# Patient Record
Sex: FEMALE | Race: WHITE | Employment: FULL TIME | URBAN - METROPOLITAN AREA
[De-identification: names, ages, dates, MRNs, and addresses within clinical notes are randomized per-mention and may not be internally consistent; named-entity substitution may affect disease eponyms.]

---

## 2022-07-25 ENCOUNTER — OFFICE VISIT (OUTPATIENT)
Dept: ENT CLINIC | Age: 57
End: 2022-07-25
Payer: COMMERCIAL

## 2022-07-25 VITALS
WEIGHT: 209.8 LBS | TEMPERATURE: 97.2 F | BODY MASS INDEX: 33.72 KG/M2 | DIASTOLIC BLOOD PRESSURE: 71 MMHG | HEART RATE: 79 BPM | HEIGHT: 66 IN | SYSTOLIC BLOOD PRESSURE: 135 MMHG

## 2022-07-25 DIAGNOSIS — H90.3 SENSORINEURAL HEARING LOSS, BILATERAL: Primary | ICD-10-CM

## 2022-07-25 PROCEDURE — 99204 OFFICE O/P NEW MOD 45 MIN: CPT | Performed by: OTOLARYNGOLOGY

## 2022-07-25 NOTE — PROGRESS NOTES
CHIEF COMPLAINT: Hearing loss    HISTORY OF PRESENT ILLNESS:  49 y.o. female referred by Dr. Taisha Zhong who presents with concern of hearing loss in both ears which began 16 days ago. In the middle of the day the patient felt the sudden onset of fullness in both the ears with decrease in hearing. He had some nasal inhalant allergy symptoms at that time. He had no ear pain or otorrhea. She consulted an urgent care center where she was placed on 10 mg of prednisone a day for 5 days but her symptoms did not improve. She consulted her primary care physician who started her on a 12-day course of decreasing steroids. She is now on the 11th day and feels no improvement. No tinnitus. No vertigo. PAST MEDICAL HISTORY:   Social History     Tobacco Use   Smoking Status Never   Smokeless Tobacco Never                                                    Social History     Substance and Sexual Activity   Alcohol Use Yes                                                  No current outpatient medications on file. Past Medical History:   Diagnosis Date    Allergic rhinitis     Asthma     Dizziness     Hearing loss     Tinnitus                                                     Past Surgical History:   Procedure Laterality Date    TONSILLECTOMY       FAMILY HISTORY: Family history reviewed. Except as noted in history of present illness, there is no pertinent family history      REVIEW OF SYSTEMS:  All pertinent positive and negative review of systems included in HPI. Otherwise, all systems are reviewed and negative.     PHYSICAL EXAMINATION:   GENERAL: wdwn- no acute distress  RESPIRATORY:  No stridor or respiratory distress  COMMUNICATION :  Normal voice  MENTAL STATUS:  Mood and affect normal, oriented X 3  HEAD AND FACE:  No abnormalities of the skin of face or head  EXTERNAL EARS AND NOSE:  Normal pinnae bilateral  FACIAL MUSCLES:  All branches of facial nerve intact  EXTRAOCULAR MUSCLES: Intact with full range of motion  FACE PALPATION:  No tenderness over sinuses. Zygomatic arches and orbital rims intact  OTOSCOPY:  Normal external auditory canals, tympanic membranes, and middle ear spaces  TUNING FORKS: Rinne ++ Martin midline at 512 Hz  INTRANASAL:  Septum midline, turbinates normal, meati clear. LIPS, TEETH, GINGIVA:  Normal mucosa  PHARYNX:  Normal  NECK:  No masses. LYMPHATIC:  No cervical adenopathy  SALIVARY GLANDS:  No swelling or masses in the parotid or submandibular salivary glands  THYROID:  No goiter or thyroid masses. IMPRESSION: Subjective hearing loss with normal otoscopy, refractory to steroid treatment. PLAN: Audiogram ordered. Our audiologist is unavailable this week but we will try and get a hearing test done at another of our audiology facilities. FOLLOW-UP: After audiometric evaluation.

## 2022-07-26 ENCOUNTER — PROCEDURE VISIT (OUTPATIENT)
Dept: AUDIOLOGY | Age: 57
End: 2022-07-26
Payer: COMMERCIAL

## 2022-07-26 DIAGNOSIS — H90.A22 SENSORINEURAL HEARING LOSS (SNHL) OF LEFT EAR WITH RESTRICTED HEARING OF RIGHT EAR: Primary | ICD-10-CM

## 2022-07-26 PROCEDURE — 92557 COMPREHENSIVE HEARING TEST: CPT | Performed by: AUDIOLOGIST

## 2022-07-26 PROCEDURE — 92567 TYMPANOMETRY: CPT | Performed by: AUDIOLOGIST

## 2022-07-26 NOTE — PROGRESS NOTES
[facility]    7/26/2022     Patient name: Grace Carter  Primary Care Physician: [address] [place] [address]   Medical Record Number: [id number]     Grace Carter   1965, 49 y.o. female   [id number]       Referring Provider: Vicente Taylor MD  Referral Type: In an order in [address] [place] [address]    Reason for Visit: Evaluation of the cause of disorders of hearing, tinnitus, or balance. ADULT AUDIOLOGIC EVALUATION                    Grace Carter is a 49 y.o. female seen today, 7/26/2022 , for audiologic evaluation. AUDIOLOGIC AND OTHER PERTINENT MEDICAL HISTORY:      Grace Carter presents with sudden onset of diminished hearing bilaterally, more impacted on left side. Tried prednisone on two occasions with no improvement since onset of the hearing loss. Patient was seen by Vicente Taylor MD on 7/25/2022. IMPRESSIONS:      Today's results are consistent with left asymmetrical profound sensorineural hearing loss with poor word recognition and normal middle ear function. Right ear demonstrates mild high frequency sensorineural loss with excellent word recognition and normal middle ear function. Patient to follow medical recommendations per  Vicente Taylor MD.     ASSESSMENT AND FINDINGS:     Otoscopy revealed: Visible tympanic membrane bilaterally    Reliability: Good   Transducer: Inserts    RIGHT EAR:  Hearing Sensitivity: Normal to mild sensorineural hearing loss. Speech Recognition Threshold: 20 dB HL  Word Recognition: Excellent (%), based on NU-6   Tympanometry: Normal peak pressure and compliance, Type A tympanogram, consistent with normal middle ear function. Acoustic Reflexes: Ipsilateral: Present at elevated sensation levels and Absent at isolated frequencies. LEFT EAR:  Hearing Sensitivity: Profound asymmetrical sensorineural hearing loss.   Speech Recognition Threshold: 90 dB HL  Word Recognition: Very Poor (0-59%), based on NU-6 Tympanometry: Normal peak pressure and compliance, Type A tympanogram, consistent with normal middle ear function. Acoustic Reflexes: Ipsilateral: Present at normal sensation levels, Present at elevated sensation levels, and Absent at isolated frequencies. COUNSELING:      Reviewed purpose of testing completed today, general auditory system function, and results obtained today. The following items are recommended based on patient report and results from today's appointment:   - Continue medical follow-up with Ирина José MD.   - Retest hearing as medically indicated and/or sooner if a change in hearing is noted. Chart CC'd to: Ирина José MD      Degree of   Hearing Sensitivity dB Range   Within Normal Limits (WNL) 0 - 20   Mild 20 - 40   Moderate 40 - 55   Moderately-Severe 55 - 70   Severe 70 - 90   Profound 90 +        RECOMMENDATIONS:        Dr Adriana Stewart to medically advise.     Martha Avery  Audiologist    Electronically signed by Martha Avery on 7/26/22 at 1:43 PM.

## 2022-07-26 NOTE — Clinical Note
Hi Dr Adriana Stewart, audiogram is ready for review. Confirms, bilateral sensorineural impairment, with profound left sided asymmetry-responses were vibrotactile.    TY.

## 2022-07-27 ENCOUNTER — TELEPHONE (OUTPATIENT)
Dept: ENT CLINIC | Age: 57
End: 2022-07-27

## 2022-07-27 ENCOUNTER — OFFICE VISIT (OUTPATIENT)
Dept: ENT CLINIC | Age: 57
End: 2022-07-27
Payer: COMMERCIAL

## 2022-07-27 VITALS
DIASTOLIC BLOOD PRESSURE: 69 MMHG | HEIGHT: 66 IN | WEIGHT: 205.8 LBS | HEART RATE: 74 BPM | SYSTOLIC BLOOD PRESSURE: 119 MMHG | BODY MASS INDEX: 33.07 KG/M2 | TEMPERATURE: 97.3 F

## 2022-07-27 DIAGNOSIS — H91.22 SUDDEN HEARING LOSS, LEFT: ICD-10-CM

## 2022-07-27 DIAGNOSIS — H90.3 SENSORINEURAL HEARING LOSS, BILATERAL: Primary | ICD-10-CM

## 2022-07-27 PROCEDURE — 99212 OFFICE O/P EST SF 10 MIN: CPT | Performed by: OTOLARYNGOLOGY

## 2022-07-27 RX ORDER — ACYCLOVIR 400 MG/1
400 TABLET ORAL
Qty: 40 TABLET | Refills: 0 | Status: SHIPPED | OUTPATIENT
Start: 2022-07-27 | End: 2022-08-04

## 2022-07-27 RX ORDER — PREDNISONE 50 MG/1
50 TABLET ORAL DAILY
Qty: 5 TABLET | Refills: 0 | Status: SHIPPED | OUTPATIENT
Start: 2022-07-27 | End: 2022-08-01

## 2022-07-27 NOTE — TELEPHONE ENCOUNTER
Pt called says they went to the pharmacy to fill the prednisone tablets given by Dr. Boris Baron, they told the pt that taking prednisone for 12 days in total was not good and that she should not take anymore prednisone. Does Dr. Boris Baron have any alternatives? Please give the pt a call back to discuss other options if possible, thank you!

## 2022-07-27 NOTE — PROGRESS NOTES
FOLLOW UP VISIT:    CHIEF COMPLAINT: Hearing loss    INTERIM HISTORY: Seen 3 days ago with complaint of hearing loss in both ears, left worse than right. Began a little over 2 weeks prior to our visit. Otoscopy was normal.  Audiogram ordered. PAST MEDICAL HISTORY:   Social History     Tobacco Use   Smoking Status Never   Smokeless Tobacco Never                                                    Social History     Substance and Sexual Activity   Alcohol Use Yes                                                  No current outpatient medications on file. Past Medical History:   Diagnosis Date    Allergic rhinitis     Asthma     Dizziness     Hearing loss     Tinnitus                                                     Past Surgical History:   Procedure Laterality Date    TONSILLECTOMY         FAMILY HISTORY: Family history reviewed and except as pertinent to the interim history is not contributory. REVIEW OF SYSTEMS:  All pertinent positive and negative findings included in HPI. Otherwise, all other systems are reviewed and are negative    PHYSICAL EXAMINATION:   GENERAL: wdwn- no acute distress  RESPIRATORY: No stridor. COMMUNICATION :  Normal voice  MENTAL STATUS: Alert and oriented x3  HEAD AND FACE: No skin lesions of the face. EXTERNAL EARS AND NOSE: The external ears and nasal pyramid are normal.  FACIAL MUSCLES: All branches of the facial nerve intact. EXTRAOCULAR MUSCLES: Intact with full range of motion. OTOSCOPY:  Normal tympanic membranes, middle ear spaces, and external auditory canals. TUNING FORKS:  Rinne ++ Martin midline at 512 Hz  AUDIOGRAM & TYMPANOGRAM REVIEWED:  Profound hearing loss in the left ear. Auditory discrimination is also markedly decreased. Middle ear dynamics are normal.  IMPRESSION: Profound sudden sensorineural hearing loss. PLAN: Reviewed audiogram with patient.   Discussed the etiology of sudden sensorineural hearing loss.  Prednisone 50 mg/day prescribed for 5 days. In addition, acyclovir 2 g/day prescribed. FOLLOW-UP: 5 days, at which time we will reassess hearing and begin tapering the steroids.

## 2022-08-02 ENCOUNTER — OFFICE VISIT (OUTPATIENT)
Dept: ENT CLINIC | Age: 57
End: 2022-08-02
Payer: COMMERCIAL

## 2022-08-02 VITALS
HEIGHT: 66 IN | BODY MASS INDEX: 33.4 KG/M2 | WEIGHT: 207.8 LBS | TEMPERATURE: 97.4 F | SYSTOLIC BLOOD PRESSURE: 109 MMHG | HEART RATE: 84 BPM | DIASTOLIC BLOOD PRESSURE: 74 MMHG

## 2022-08-02 DIAGNOSIS — H90.3 SENSORINEURAL HEARING LOSS, BILATERAL: Primary | ICD-10-CM

## 2022-08-02 DIAGNOSIS — H91.22 SUDDEN HEARING LOSS, LEFT: ICD-10-CM

## 2022-08-02 PROCEDURE — 99212 OFFICE O/P EST SF 10 MIN: CPT | Performed by: OTOLARYNGOLOGY

## 2022-08-02 RX ORDER — PREDNISONE 10 MG/1
TABLET ORAL
Qty: 20 TABLET | Refills: 0 | Status: SHIPPED | OUTPATIENT
Start: 2022-08-02 | End: 2022-08-12

## 2022-08-02 NOTE — PROGRESS NOTES
FOLLOW UP VISIT:    CHIEF COMPLAINT: Sudden hearing loss. INTERIM HISTORY: Profound sudden sensorineural hearing loss in the left ear. Diagnosed 5 days ago. Because the audiogram showed such profound loss the patient was started on prednisone 50 mg a day for 5 days as well as antivirals. No subjective improvement. PAST MEDICAL HISTORY:   Social History     Tobacco Use   Smoking Status Never   Smokeless Tobacco Never                                                    Social History     Substance and Sexual Activity   Alcohol Use Yes                                                    Current Outpatient Medications:     acyclovir (ZOVIRAX) 400 MG tablet, Take 1 tablet by mouth 5 times daily for 8 days, Disp: 40 tablet, Rfl: 0                                                 Past Medical History:   Diagnosis Date    Allergic rhinitis     Asthma     Dizziness     Hearing loss     Tinnitus                                                     Past Surgical History:   Procedure Laterality Date    TONSILLECTOMY         FAMILY HISTORY: Family history reviewed and except as pertinent to the interim history is not contributory. REVIEW OF SYSTEMS:  All pertinent positive and negative findings included in HPI. Otherwise, all other systems are reviewed and are negative    PHYSICAL EXAMINATION:   GENERAL: wdwn- no acute distress  RESPIRATORY: No stridor. COMMUNICATION :  Normal voice  MENTAL STATUS: Alert and oriented x3  HEAD AND FACE: No skin lesions of the face. EXTERNAL EARS AND NOSE: The external ears and nasal pyramid are normal.  FACIAL MUSCLES: All branches of the facial nerve intact. EXTRAOCULAR MUSCLES: Intact with full range of motion. OTOSCOPY:  Normal tympanic membranes, middle ear spaces, and external auditory canals. TUNING FORKS:  Rinne ++ Martin midline at 512 Hz      IMPRESSION: Profound sudden sensorineural hearing loss left ear.     PLAN: Begin to taper prednisone over an 8-day course. Follow-up audiogram in 3 weeks. With discussed with the patient the possibility of intratympanic corticosteroids. Also brought up the suggestion of imaging the internal auditory canals. The present time, the patient's auditory discrimination is at 44%. If this does improve amplification may be indicated.   FOLLOW-UP: 3 weeks

## 2022-08-25 ENCOUNTER — PROCEDURE VISIT (OUTPATIENT)
Dept: AUDIOLOGY | Age: 57
End: 2022-08-25
Payer: COMMERCIAL

## 2022-08-25 ENCOUNTER — OFFICE VISIT (OUTPATIENT)
Dept: ENT CLINIC | Age: 57
End: 2022-08-25
Payer: COMMERCIAL

## 2022-08-25 VITALS
WEIGHT: 219 LBS | HEIGHT: 66 IN | TEMPERATURE: 97.4 F | HEART RATE: 85 BPM | SYSTOLIC BLOOD PRESSURE: 135 MMHG | BODY MASS INDEX: 35.2 KG/M2 | DIASTOLIC BLOOD PRESSURE: 78 MMHG

## 2022-08-25 DIAGNOSIS — H90.3 SENSORINEURAL HEARING LOSS, BILATERAL: Primary | ICD-10-CM

## 2022-08-25 DIAGNOSIS — H91.22 SUDDEN HEARING LOSS, LEFT: ICD-10-CM

## 2022-08-25 DIAGNOSIS — H90.3 SENSORINEURAL HEARING LOSS, BILATERAL: ICD-10-CM

## 2022-08-25 DIAGNOSIS — H91.22 SUDDEN HEARING LOSS, LEFT: Primary | ICD-10-CM

## 2022-08-25 DIAGNOSIS — H93.8X2 EAR PRESSURE, LEFT: ICD-10-CM

## 2022-08-25 DIAGNOSIS — H93.12 TINNITUS, LEFT EAR: ICD-10-CM

## 2022-08-25 PROCEDURE — 92552 PURE TONE AUDIOMETRY AIR: CPT | Performed by: AUDIOLOGIST

## 2022-08-25 PROCEDURE — 92556 SPEECH AUDIOMETRY COMPLETE: CPT | Performed by: AUDIOLOGIST

## 2022-08-25 PROCEDURE — 99213 OFFICE O/P EST LOW 20 MIN: CPT | Performed by: OTOLARYNGOLOGY

## 2022-08-25 NOTE — PROGRESS NOTES
Jesse Hinson   1965, 56 y.o. female   [id number]       Referring Provider: Margie Shah MD  Referral Type: In an order in [address] [place] [address]    Reason for Visit: Evaluation of suspected change in hearing, tinnitus, or balance. ADULT AUDIOLOGIC EVALUATION      Jesse Hinson is a 56 y.o. female seen today, 8/25/2022, for a recheck audiologic evaluation. AUDIOLOGIC AND OTHER PERTINENT MEDICAL HISTORY:        Jesse Hinson noted sudden onset left ear hearing loss, onset 7/9/2022, has completed steroid oral medications and been followed by Dr. David Denney, had audiogram completed 7/26/2022 at [facility] as I was out of the office at that time; has tinnitus in left ear, ringing and clicking; fullness in left ear; has had some dizziness, note some dizziness for a couple days after last hearing test.    Jesse Hinson denied otalgia and otorrhea. IMPRESSIONS:       Today's results are consistent with left ear severe to profound sensorineural hearing loss. Hearing loss is significant enough to result in difficulty understanding speech in most to all listening environments. Discussed considerations for management of her hearing loss, including BiCROS, BAHA, and CI, pending medical clearance. Follow medical recommendations from Dr. David Denney. ASSESSMENT AND FINDINGS:       Otoscopy revealed: Clear ear canals bilaterally      RIGHT EAR:  Hearing Sensitivity: Within normal limits through 2000 Hz sloping to mild sensorineural hearing loss. Speech Recognition Threshold: 15 dBHL  Word Recognition: Excellent (96%), based on NU-6 25-word list at 50 dBHL using recorded speech stimuli. Tympanometry: Did not test.      LEFT EAR:  Hearing Sensitivity: Severe to profound sensorineural hearing loss. Speech Recognition Threshold: NR at 110 dBHL, masked; Speech Detection Threshold: 90 dBHL, masked.   Word Recognition: Could not test.  Tympanometry: Did not test.    NOTE: An asymmetry of 15 dBHL or greater is present across all tested frequency; there is also an asymmetry in word recognition     NOTE: Patient noted significant sensitivity to left ear testing 500 Hz and 1000 Hz testing. Reliability: Good  Transducer: Phones    See scanned audiogram dated 8/25/2022 for results. PATIENT EDUCATION:       The following items were discussed with the patient:   - Good Communication Strategies  - Hearing Loss and Hearing Aids  - Tinnitus Management Strategies      Educational information was shared in the After Visit Summary. RECOMMENDATIONS:                                                                                                                                                                                                                                                                      The following items are recommended based on patient report and results from today's appointment:  - Continue medical follow-up with Aixa Savage MD.  - Retest hearing as medically indicated and/or sooner if a change in hearing is noted. - If desired, schedule a Hearing Aid Evaluation (HAE) appointment to discuss hearing aid options. Also discussed considerations for BAHA and CI; she was provided with [facility] contact information: [phone]  - Utilize \"Good Communication Strategies\" as discussed to assist in speech understanding with communication partners. - Maintain a sound enriched environment to assist in the management of tinnitus symptoms. [facility] [place], [place]  Audiologist      Chart CC'd to:  Aixa Savage MD      Degree of   Hearing Sensitivity dB Range   Within Normal Limits (WNL) 0 - 20   Mild 20 - 40   Moderate 40 - 55   Moderately-Severe 55 - 70   Severe 70 - 90   Profound 90 +

## 2022-08-25 NOTE — PROGRESS NOTES
strongly suggest that we image her internal auditory canals. We will defer on injection at this time but will image. FOLLOW-UP: After MRI.

## 2022-08-25 NOTE — PATIENT INSTRUCTIONS
Good Communication Strategies    Communication can be challenging for anyone, but can be especially difficult for those with some degree of hearing loss. While we may not be able to control every factor that may lead to difficulty with communication, there are Good Communication Strategies that we can all use in our day-to-day lives. Communication takes both parties working together for it to be successful. Tips as a Listener:   Control your environment. It is important to limit the amount of background noise in the room when possible. You should also consider having a good light source in the room to best see the other person. Ask for clarification. Instead of saying \"What?\", you can use parts of what you heard to make a new question. For example, if you heard the word \"Thursday\" but not the rest of the week, you may ask \"What was that about Thursday? \" or \"What did you want to do Thursday? \". This shows the person talking that you are listening and will help them better explain what they are saying. Be an advocate for yourself. If you are hearing but not understanding, tell the other person \"I can hear you, but I need you to slow down when you speak. \"  Or if someone is facing the other direction, say \"I cannot hear you when you are not looking at me when we talk. \"       Tips as a Talker:   - Sit or stand 3 to 6 feet away to maximize audibility         -- It is unrealistic to believe someone else will fully hear your message if you are speaking from across the room or in a different room in the house   - Stay at eye level to help with visual cues   - Make sure you have the persons attention before speaking   - Use facial expressions and gestures to accentuate your message   - Raise your voice slightly (do not scream)   - Speak slowly and distinctly   - Use short, simple sentences   - Rephrase your words if the person is having a hard time understanding you    - To avoid distortion, dont speak directly into a persons ear      Some additional items that may be helpful:   - Remain patient - this is important for both parties   - Write down items that still cannot be heard/understood. You may write with pen/paper or consider typing/texting on a cell phone or smart device. - If background noise is unavoidable, try to keep yourself in a good position in the room. By sitting at a mohr on the side of the restaurant (preferably a corner), it will be easier to communicate than if you were sitting at a table in the middle with background noise surrounding you. Keep yourself positioned away from music speakers or heavy foot traffic.   - If you have difficulty with the television, consider these options:      -- Use closed-captioning, which is a setting you can turn on that displays the spoken words in a written form on the screen. There may be a slight delay, but this can help fill in missing information. This can be especially helpful when watching programs with accented speech. -- Consider use of a sound bar or speakers that come from the front of the TV. With modern flat screen TVs, many of them have speakers that come out of the back of the device, which makes sound bounce off the wall behind it, then go into the room. Sound bars can allow the sound to go straight in your direction and can improve sound quality. -- Consider ear level devices to help improve the volume and/or sound quality of the program.  There are devices that work like headphones that you can adjust the volume for your ears while others can have the volume at a more comfortable level, such as \"TV Ears\". Most hearing aids have devices that allow them to connect directly to the TV and improve sound quality. Tinnitus: Overview and Management Strategies          Many people have some ringing sounds in their ears once in a while. You may hear a roar, a hiss, a tinkle, or a buzz. The sound usually lasts only a few minutes.  If it goes on all the time, you may have tinnitus. Tinnitus is usually caused by long-term exposure to loud noise. This damages the nerves in the inner ear. It can occur with all types of hearing loss. It may be a symptom of almost any ear problem. Tinnitus may be caused by a buildup of earwax. Or, it may be caused by ear infections or certain medicines (especially antibiotics or large amounts of aspirin). You can also hear noises in your ears because of an injury to the ears, drinking too much alcohol or caffeine, or a medical condition. Other conditions may also contribute to tinnitus, including: head and neck trauma, temporomandibular joint disorder (TMJ), sinus pressure and barometric trauma, traumatic brain injury, metabolic disorders, autoimmune disorders, stress, and high blood pressure. You may need tests to evaluate your hearing and to find causes of long-lasting tinnitus. Your doctor may suggest one or more treatments to help you cope with the tinnitus. You can also do things at home to help reduce symptoms. Follow-up care is a key part of your treatment and safety. Be sure to make and go to all appointments, and call your doctor if you are having problems. It's also a good idea to know your test results and keep a list of the medicines you take. How can you care for yourself at home? Limit or cut out alcohol, caffeine, and sodium. They can make your symptoms worse. Do not smoke or use other tobacco products. Nicotine reduces blood flow to the ear and makes tinnitus worse. If you need help quitting, talk to your doctor about stop-smoking programs and medicines. These can increase your chances of quitting for good. Talk to your doctor about whether to stop taking aspirin and similar products such as ibuprofen or naproxen. Get exercise often. It can help improve blood flow to the ear. Ways to manage/cope with tinnitus  Some tinnitus may last a long time.  To manage your tinnitus, try to:  Avoid noises that you think caused your tinnitus. If you can't avoid loud noises, wear earplugs or earmuffs. Ignore the sound by paying attention to other things. Keeping your brain busy with other tasks or background noise can help your brain not focus on the tinnitus. Try to not give the tinnitus an emotional reaction. Do your best to ignore the sound and not let it bother you. Relax using biofeedback, meditation, or yoga. Feeling stressed and being tired can make tinnitus worse. Play music or white noise to help you sleep. Background noise may cover up the noise that you hear in your ears. You can buy a tabletop machine or a device that sits under your pillow to play soothing sounds, like ocean waves. Smart phones have free apps, such as Whist, Relax Melodies, ReSound Relief, and [employer]. These apps have different types of sounds/noise, some of which you can blend together to find sounds that are most soothing to you. Hearing aid technology, especially when there is some hearing loss, may help reduce tinnitus symptoms by giving your brain better access to the sounds it is missing. There are some hearing aids with built-in noise generator programs, which may help when amplification alone is not enough. Additional resources may be found through the American Tinnitus Association at www.olga.org    When should you call for help? Call 911 anytime you think you may need emergency care. For example, call if:    You have symptoms of a stroke. These may include:  Sudden numbness, tingling, weakness, or loss of movement in your face, arm, or leg, especially on only one side of your body. Sudden vision changes. Sudden trouble speaking. Sudden confusion or trouble understanding simple statements. Sudden problems with walking or balance. A sudden, severe headache that is different from past headaches. Call your doctor now or seek immediate medical care if:    You develop other symptoms. sounds it is missing. If you are beginning your process with hearing aid(s), schedule a \"Hearing Aid Evaluation\" with an audiologist to discuss your lifestyle, features of hearing aid technology, and styles of hearing aids available. It is recommended that you contact your insurance company to determine if you have a hearing aid benefit, as this may dictate who you can see for these services. Have hearing tests as your doctor suggests. They can show whether your hearing has changed. Your hearing aid may need to be adjusted. Use other assistive devices as needed. These may include:  Telephone amplifiers and hearing aids that can connect to a television, stereo, radio, or microphone. Devices that use lights or vibrations. These alert you to the doorbell, a ringing telephone, or a baby monitor. Television closed-captioning. This shows the words at the bottom of the screen. Most new TVs can do this. TTY (text telephone). This lets you type messages back and forth on the telephone instead of talking or listening. These devices are also called TDD. When messages are typed on the keyboard, they are sent over the phone line to a receiving TTY. The message is shown on a monitor. Use pagers, fax machines, text, and email if it is hard for you to communicate by telephone. Try to learn a listening technique called speech-reading. It is not lip-reading. You pay attention to people's gestures, expressions, posture, and tone of voice. These clues can help you understand what a person is saying. Face the person you are talking to, and have him or her face you. Make sure the lighting is good. You need to see the other person's face clearly. Think about counseling if you need help to adjust to your hearing loss. When should you call for help? Watch closely for changes in your health, and be sure to contact your doctor if:    You think your hearing is getting worse.      You have new symptoms, such as dizziness or nausea.

## 2022-09-08 ENCOUNTER — TELEPHONE (OUTPATIENT)
Dept: ENT CLINIC | Age: 57
End: 2022-09-08

## 2022-10-26 ENCOUNTER — PROCEDURE VISIT (OUTPATIENT)
Dept: AUDIOLOGY | Age: 57
End: 2022-10-26

## 2022-10-26 DIAGNOSIS — H90.3 SENSORINEURAL HEARING LOSS, BILATERAL: Primary | ICD-10-CM

## 2022-10-26 PROCEDURE — 99999 PR OFFICE/OUTPT VISIT,PROCEDURE ONLY: CPT | Performed by: AUDIOLOGIST

## 2022-10-26 NOTE — PROGRESS NOTES
Nisha Petty   1965, 56 y.o. female  [id number]       HEARING [address] was seen today, 10/26/2022 for a Hearing Aid Evaluation. Patient has no prior history of hearing aid use. A demo [address] was programmed to AL3 and were used in today's appointment. INSURANCE:  Her insurance was contacted and she does have a benefit for hearing aids (please see scanned insurance verification worksheet in Media tab). Anticipated $500 benefit. SOUNDFIELD TESTING:       Otoscopy revealed: Clear ear canals bilaterally    Deferred SFT at this time. LIFESTYLE EVALUATION:       Primary listening situations Nisha Petty would like to improve:  Social situations  Work when she is in [employer] at [employer] in car as passenger    Other pertinent lifestyle needs: She works at a [employer] as a  during [employer] hours and also works at home. Phone connectivity:  Patient has an iPhone. She is interested in streaming, phone calls. DEVICE SELECTION:       After a discussion of the various styles, technology levels, and features of available in hearing aid technology in relation to his lifestyle needs, Nisha Petty has decided to pursue hearing aids. Technology to be ordered: [address], R: Oticon More 1 miniRITE-R, L: Oticon CROS PX, #1-60dB receivers, 6mm open bass domes, color 90      Patient cost due at time of fitting: $3600 of total $4100 to be billed to insurance. Will verify OOP prior to fitting. PATIENT EDUCATION:         - Verbally reviewed various styles, technology levels, and features of available hearing aid technology and realistic expectations with amplification.   - Patient was provided with a brochure for Oticon CROS hearing aids. RECOMMENDATIONS:        - Return in about 2 weeks to be fit with hearing aids. An appointment was scheduled for 11/10/2022. She is interested in a sooner appointment if available.       No charge for today's appointment.       [facility]re[facility] [place]  Audiologist

## 2022-10-31 ENCOUNTER — PROCEDURE VISIT (OUTPATIENT)
Dept: AUDIOLOGY | Age: 57
End: 2022-10-31

## 2022-10-31 DIAGNOSIS — H90.3 SENSORINEURAL HEARING LOSS, BILATERAL: Primary | ICD-10-CM

## 2022-10-31 PROCEDURE — 99999 PR OFFICE/OUTPT VISIT,PROCEDURE ONLY: CPT | Performed by: AUDIOLOGIST

## 2022-10-31 NOTE — PROGRESS NOTES
Jossue Lama  [birth year] y.o. female   [id number]      HEARING [address] was seen today, 10/31/22, for a Hearing Aid Evaluation follow-up to be fit with loaner devices until her devices are received. Planned for hearing aid fitting to be moved to today, however, this was not possible given her devices had not been received. Contacted patient earlier today and agreed to being fit with loaner devices until her next scheduled appointment. Completed brief and general device orientation with Oticon OpnS 1 (right) and CROS (left) devices, including insertion/removal, battery insertion/removal, battery warning, water precautions, and expected use. Also paired to her iPhone and demonstrated use of opal for volume adjustment, as OpnS side only has one push button with volume control unavailable.'    Rescheduled fitting date to 11/10/2022. Patient cost due at time of fitting: $3600; will look into OOP and may be less patient responsibility at time of fitting. No charge for today's appointment. RECOMMENDATIONS:      - Return in 1-2 weeks to be fit with hearing aids.         [facility] [place], [place]  Audiologist

## 2022-11-10 ENCOUNTER — PROCEDURE VISIT (OUTPATIENT)
Dept: AUDIOLOGY | Age: 57
End: 2022-11-10

## 2022-11-10 DIAGNOSIS — H90.3 SENSORINEURAL HEARING LOSS, BILATERAL: Primary | ICD-10-CM

## 2022-11-10 NOTE — PROGRESS NOTES
Mikayla Hickey   1965, 56 y.o. female   [id number]     HEARING AID FITTING      RIGHT EAR: /MODEL: Oticon More 1 miniRITE-R  SN: [id number]  WIRE/DOME: #1-60dB/6mm open bass    LEFT EAR: /MODEL: Oticon CROS PX  SN: [id number]  WIRE/DOME: #1-60dB/6mm open bass    : /MODEL: [employer]  SN: [id number]   HAF: 11/10/2022  WARRANTY: 11/27/2025    BUTTONS: Short Press (Volume): Right raises, Left lowers  PROGRAMS: General  PHONE CONNECTIVITY: Paired to iPhone, use of opal      Mikayla Hickey was seen today, 11/10/2022, to be fit with [employer] system. Devices were connected to fitting software, and hearing aids were programmed to AL3. Maximized noise management settings. Device orientation was completed, including:   Hearing aid insertion/removal  Buttons/Indicators  Battery charging, life expectancy   Battery charging and insertion/removal  Cleaning/maintenance of the device  Telephone use and phone placement  Acclimatization period and importance of consistent use of devices  Realistic expectations with hearing aids  Repair/Loss & Damage warranty information  30-day right-to-return period    Mikayla Hickey noted good sound quality. It was recommended that patient return for a follow-up appointment within the 30-day right-to-return period for further programming adjustments and education of the devices as warranted. PATIENT EDUCATION:     Mikayla Hickey was provided with the Hearing Aid Fitting Checklist as a reference of items discussed at today's appointment. Patient may find additional product information in the provided hearing aid  device manual.     RECOMMENDATIONS:      - Return for follow-up during right-to-return period.   - Goal of consistent daily hearing aid use. - Contact Audiology if concerns arise. Patient paid $1400.99 of total $4368.75 to be billed to insurance. See associated charges below.   Description Code Amount   Hearing Aids (BiCROS)  $3090.00   (Right: $2090, Left: $1000.00)   Dispensing Fee  $300.00   Fitting Fee (Non-Refundable)  $300.00   Earmold Non-Custom  x2 $50.00 x2 = $100.00   1-Year Service Plan  $250.00   Conformity Evaluation   (Real-Ear Measurements)  $60.00   Accessory: Connect Clip  (remote microphone) [id number] $268.75       NOTE: Patient returned devices on 12/22/2022 as exchange for a different . These charges to be cancelled out and to bill out hearing aid dispensing codes from 12/12/2022 only.     [facility] [place], [place]  Audiologist

## 2022-11-11 ENCOUNTER — TELEPHONE (OUTPATIENT)
Dept: ENT CLINIC | Age: 57
End: 2022-11-11

## 2022-11-11 NOTE — TELEPHONE ENCOUNTER
Patient is calling to let you know that she is having static in her hearing aids, she is available between now and 11, she will not be able to answer her phone between 11-1

## 2022-11-23 ENCOUNTER — PROCEDURE VISIT (OUTPATIENT)
Dept: AUDIOLOGY | Age: 57
End: 2022-11-23

## 2022-11-23 DIAGNOSIS — H90.3 SENSORINEURAL HEARING LOSS, BILATERAL: Primary | ICD-10-CM

## 2022-11-23 PROCEDURE — 99999 PR OFFICE/OUTPT VISIT,PROCEDURE ONLY: CPT | Performed by: AUDIOLOGIST

## 2022-11-23 NOTE — PROGRESS NOTES
Jarred Ocampo   1965, 56 y.o. female   [id number]     RIGHT EAR: /MODEL: Oticon More 1 miniRITE-R  SN: [id number]  WIRE/DOME: #1-60dB/6mm open bass    LEFT EAR: /MODEL: Oticon CROS PX  SN: [id number]  WIRE/DOME: #1-60dB/6mm open bass    : /MODEL: [employer]  SN: [id number]   HAF: 11/10/2022  WARRANTY: 11/27/2025     BUTTONS: Short Press (Volume): Right raises, Left lowers  PROGRAMS: General  PHONE CONNECTIVITY: Paired to iPhone, use of opal      [address] [facility] was seen today, 11/23/2022, for a hearing aid check appointment. Patient noted she has had issues with Bluetooth connectivity; it is intermittent and at times she has needed to restart the hearing aids or the connected device. [employer] PROCEDURES:     Otoscopy revealed: Clear ear canals bilaterally  A listening check revealed good function of the devices. Connected devices to fitting software. Added Speech in Noise program in P2 position. Tried changing from BiCROS to CROS with no notable difference in sound quality in quiet; she ntoed she would prefer to keep the amplification in BiCROS to assist when she may have difficulty hearing. Elected to decrease form AL3 to AL2 to see if this may assist with the extra noise in quiet. Discussed this may be related to general circuit noise with amplification; she may lower volume as needed for comfort as well. Forgot pairing and re-paired right hearing aid to her phone. The firmware was up to date. Datalogging revealed 14 hours of use per day. Reviewed Phonak as consideration for BiCROS. Discussed standard bluetooth connectivity but limited battery life (~12 hours) compared to Oticon lasting all day. She noted the battery life would bother her. Discussed that charging a bit during the day would extend the battery life somewhat. PATIENT EDUCATION:      - Reviewed programming changes made today.    - Reviewed realistic expectations, especially in relation to monaural vs binaural hearing, such as in background noise. - Reviewed differences between [facility] and Oticon CROS systems; may consider   RECOMMENDATIONS:     Continue consistent hearing aid use. Return for hearing aid checks as needed; an appointment was scheduled for 12/12/2022. Contact Audiology if concerns arise; discussed that I will need to order Phonak CROS system if desired. No charge for today's appointment; patient under service warranty through 11/10/2023.         [facility] [place], [place]  Audiologist

## 2022-12-12 ENCOUNTER — PROCEDURE VISIT (OUTPATIENT)
Dept: AUDIOLOGY | Age: 57
End: 2022-12-12

## 2022-12-12 DIAGNOSIS — H90.3 SENSORINEURAL HEARING LOSS, BILATERAL: Primary | ICD-10-CM

## 2022-12-12 NOTE — PROGRESS NOTES
Caroline Mejias   1965, 56 y.o. female   [id number]     HEARING AID FITTING      RIGHT EAR: /MODEL: Phonak Audeo P90-R  SN: [id number]  WIRE/DOME: 1M/small open    LEFT EAR: /MODEL: Phonak CROS P-R  SN: [id number]  WIRE/DOME: 1C/small open    : /MODEL: Phonak  Case Combi  SN: [id number]  Power Pack SN: [id number]   HAF: 12/12/2022  WARRANTY: 3/4/2026    BUTTONS: Short Press (Volume): Right: Volume raises/lowers, Left: CROS balance  PROGRAMS: [place]  PHONE CONNECTIVITY: Paired to phone for streaming, opal not compatible with CROS      Caroline Mejias was seen today, 12/12/2022, to be fit with Phonak BiCROS system: Right Audeo P90-R, Left: CROS P-R hearing aids. Otoscopy revealed clear ear canals bilaterally, and devices appeared to be a good fit. Devices were connected to fitting software, and hearing aids were programmed to 100% target gain. Feedback manager was completed. Strengthened noise management in automatic settings       Device orientation was completed, including:   Hearing aid insertion/removal  Buttons/Indicators  Battery charging, life expectancy   Battery charging and insertion/removal  Cleaning/maintenance of the device  Telephone use and phone placement  Acclimatization period and importance of consistent use of devices  Realistic expectations with hearing aids  Repair/Loss & Damage warranty information  30-day right-to-return period    Caroline Mejias noted good sound quality. It was recommended that patient return for a follow-up appointment within the 30-day right-to-return period for further programming adjustments and education of the devices as warranted. PATIENT EDUCATION:     Caroline Mejias was provided with the Hearing Aid Fitting Checklist as a reference of items discussed at today's appointment.   Patient may find additional product information in the provided hearing aid  device manual.     RECOMMENDATIONS: - Goal of consistent daily hearing aid use. - Return for follow-up to evaluate right-to-return period with devices. - Contact Audiology if concerns arise. Patient paid $1400.99 on 11/10/2022. See associated charges below for these devices.   Description Code Amount   Hearing Aids (BiCROS) [phone] $3090.00   (Right: $2090, Left: $1000)   Dispensing Fee  $300.00   Fitting Fee (Non-Refundable)  $300.00   Earmold Non-Custom  x2 $50.00 x2 = $100.00   1-Year Service Plan  $250.00   Conformity Evaluation   (Real-Ear Measurements)  $60.00        Renelle Martha [place]  Audiologist

## 2022-12-21 NOTE — PROGRESS NOTES
Mikayla Paauilo   1965, 56 y.o. female   [id number]     RIGHT EAR: /MODEL: Katelyn Osunay P90-R  SN: [id number]  WIRE/DOME: 1M/medium open    LEFT EAR: /MODEL: Phonak CROS P-R  SN: [id number]  WIRE/DOME: 1C/medium open    : /MODEL: Phonak  Case Combi  SN: [id number]  Power Pack SN: [id number]   HAF: 12/12/2022  WARRANTY: 3/4/2026     BUTTONS: Short Press (Volume): Right: Volume raises/lowers, Left: CROS balance  PROGRAMS: [employer]  PHONE CONNECTIVITY: Paired to phone for streaming, opal not compatible with CROS      HEARING Fidel Suazo was seen today, 12/22/2022, for a hearing aid check appointment. Patient noted she has been having diffiuclty with battery life, but feels the hearing aids are performing as she expects them to, especially in terms of connectivity. She prefers the Venturepax devices fit on 12/12/2022, and would like to proceed with them at this time. PROCEDURES:     Otoscopy revealed: Clear ear canals bilaterally  Given her note of the devices slipping out of her ears/off of her ears, discussed considerations fro retention. Changed from small open to medium open domes, and this seemed to help. She mostly notices an issue when moving her glasses, and this did not happen in the office today with the medium size. PATIENT EDUCATION:      - Reviewed general hearing aid care and maintenance. - Reviewed fitting changes from today; she may use small vs medium domes as she feels comfortable. RECOMMENDATIONS:     Continue consistent hearing aid use. Return for hearing aid checks as needed; an appointment was scheduled for 3/13/2023. Contact Audiology if concerns arise. No charge for today's appointment; patient under service warranty through 12/12/2023.         [facility] Singh [place], [place]  Audiologist

## 2022-12-22 ENCOUNTER — PROCEDURE VISIT (OUTPATIENT)
Dept: AUDIOLOGY | Age: 57
End: 2022-12-22

## 2022-12-22 DIAGNOSIS — H90.3 SENSORINEURAL HEARING LOSS, BILATERAL: Primary | ICD-10-CM

## 2023-03-13 ENCOUNTER — PROCEDURE VISIT (OUTPATIENT)
Dept: AUDIOLOGY | Age: 58
End: 2023-03-13

## 2023-03-13 DIAGNOSIS — H90.3 SENSORINEURAL HEARING LOSS, BILATERAL: Primary | ICD-10-CM

## 2023-03-13 PROCEDURE — 99999 PR OFFICE/OUTPT VISIT,PROCEDURE ONLY: CPT | Performed by: AUDIOLOGIST

## 2023-03-13 NOTE — PROGRESS NOTES
Wilson Baltazar   1965, 56 y.o. female   [id number]     RIGHT EAR: /MODEL: Maykel Clark P90-R  SN: [id number]  WIRE/DOME: 1M/medium open    LEFT EAR: /MODEL: Phonak CROS P-R  SN: [id number]  WIRE/DOME: 1C/medium open    : /MODEL: Phonak  Case Combi  SN: [id number]  Power Pack SN: [id number]   HAF: 12/12/2022  WARRANTY: 3/4/2026     BUTTONS: Short Press (Volume): Right: Volume raises/lowers, Left: CROS balance  PROGRAMS: [employer]  PHONE CONNECTIVITY: Paired to phone for streaming, opal not compatible with CROS      HEARING AID CHECK      Wilson Baltazar was seen today, 3/13/2023, for a hearing aid check appointment. Sonja Reeves noted she has overall been doing well with her devices. Her only complaint at this time is that she activates Amy regularly. PROCEDURES:     Otoscopy revealed: Clear ear canals bilaterally  Devices were cleaned and checked. A listening check revealed good function of the devices. Microphone and  ports were suctioned, domes and wax traps were changed, and devices completed a desiccant cycle through [employer]. Post-cleaning listening check revealed good function of the devices. Connected devices to fitting software and turned off tap control for voice assistant; left active for streaming start/stop for both right and left devices. Datalogging revealed 12.7 hours of use per day. PATIENT EDUCATION:      - Reviewed general hearing aid care and maintenance. - Reviewed fitting changes from today; she may use small vs medium domes as she feels comfortable. RECOMMENDATIONS:     Continue consistent hearing aid use. Return for hearing aid checks as needed; an appointment was scheduled for 8/21/2023. Contact Audiology if concerns arise. Discussed CI, as she is still interested in this as an option.   She felt there was 0% chance if going through , however, no attempt was made to submit to insurance for approval/denial at that time. She noted she is willing to travel, within reason, to consider and possibly pursue CI. Noted that I will reach out to a colleague at [facility] for more details and pass information along to her. No charge for today's appointment; patient under service warranty through 12/12/2023.         [facility] [place], [place]  Audiologist

## 2023-03-28 ENCOUNTER — PROCEDURE VISIT (OUTPATIENT)
Dept: AUDIOLOGY | Age: 58
End: 2023-03-28
Payer: COMMERCIAL

## 2023-03-28 DIAGNOSIS — H90.3 SENSORINEURAL HEARING LOSS, BILATERAL: Primary | ICD-10-CM

## 2023-03-28 DIAGNOSIS — H93.12 TINNITUS, LEFT EAR: ICD-10-CM

## 2023-03-28 PROCEDURE — 92557 COMPREHENSIVE HEARING TEST: CPT | Performed by: AUDIOLOGIST

## 2023-03-28 NOTE — PROGRESS NOTES
Nyla Joyner   1965, 56 y.o. female   [id number]       Referring: SELF  Referral Type: N/A    Reason for Visit: Evaluation of suspected change in hearing, tinnitus, or balance. ADULT AUDIOLOGIC EVALUATION      Nyla Joyner is a 56 y.o. female seen today, 3/28/2023, for a recheck audiologic evaluation. AUDIOLOGIC AND OTHER PERTINENT MEDICAL HISTORY:        Nyla Joyner noted sudden onset left ear hearing loss, onset 7/9/2022; wears Phonak BiCROS devices fit and managed in this office; has left ear tinnitus, constant. Nyla Joyner denied otalgia, dizziness, and otorrhea. She noted no other changes since her last audiology appointment. IMPRESSIONS:       Today's results are consistent with LE>RE sensorineural hearing loss; left ear with severe to profound sensorineural hearing loss and right ear within normal limts to moderate sensorineural hearing loss; left ear with 0% word recognition at limits of the equipment and right ear with excellent word recognition for conversational speech. Hearing loss is significant enough to result in difficulty understanding speech in most to all listening environments. Given continued concern for hearing loss and tinnitus in left ear, in agreement     ASSESSMENT AND FINDINGS:       Otoscopy revealed: Clear ear canals bilaterally, however, some redness observed in right ear canal.      RIGHT EAR:  Hearing Sensitivity: Within normal limits through 2000 Hz sloping to mild to moderate sensorineural hearing loss with slight notch shape at 4000 Hz. Speech Recognition Threshold: 20 dBHL  Word Recognition: Excellent (96%), based on NU-6 25-word list at 50 dBHL using recorded speech stimuli. Tympanometry: Did not test.      LEFT EAR:  Hearing Sensitivity: Severe to profound sensorineural hearing loss. Speech Recognition Threshold: NR at 110 dBHL, masked. Speech Detection Threshold: 85 dBHL, masked.   Word Recognition:  Very Poor (0%), based on NU-6

## 2023-03-28 NOTE — PATIENT INSTRUCTIONS
goes on all the time, you may have tinnitus. Tinnitus is usually caused by long-term exposure to loud noise. This damages the nerves in the inner ear. It can occur with all types of hearing loss. It may be a symptom of almost any ear problem. Tinnitus may be caused by a buildup of earwax. Or, it may be caused by ear infections or certain medicines (especially antibiotics or large amounts of aspirin). You can also hear noises in your ears because of an injury to the ears, drinking too much alcohol or caffeine, or a medical condition. Other conditions may also contribute to tinnitus, including: head and neck trauma, temporomandibular joint disorder (TMJ), sinus pressure and barometric trauma, traumatic brain injury, metabolic disorders, autoimmune disorders, stress, and high blood pressure. You may need tests to evaluate your hearing and to find causes of long-lasting tinnitus. Your doctor may suggest one or more treatments to help you cope with the tinnitus. You can also do things at home to help reduce symptoms. Follow-up care is a key part of your treatment and safety. Be sure to make and go to all appointments, and call your doctor if you are having problems. It's also a good idea to know your test results and keep a list of the medicines you take. How can you care for yourself at home? Limit or cut out alcohol, caffeine, and sodium. They can make your symptoms worse. Do not smoke or use other tobacco products. Nicotine reduces blood flow to the ear and makes tinnitus worse. If you need help quitting, talk to your doctor about stop-smoking programs and medicines. These can increase your chances of quitting for good. Talk to your doctor about whether to stop taking aspirin and similar products such as ibuprofen or naproxen. Get exercise often. It can help improve blood flow to the ear. Ways to manage/cope with tinnitus  Some tinnitus may last a long time.  To manage your tinnitus, try

## 2023-09-06 ENCOUNTER — PROCEDURE VISIT (OUTPATIENT)
Dept: AUDIOLOGY | Age: 58
End: 2023-09-06

## 2023-09-06 DIAGNOSIS — H90.3 SENSORINEURAL HEARING LOSS, BILATERAL: Primary | ICD-10-CM

## 2023-09-06 PROCEDURE — NBSRV NON-BILLABLE SERVICE: Performed by: AUDIOLOGIST

## 2025-05-15 ENCOUNTER — PROCEDURE VISIT (OUTPATIENT)
Dept: AUDIOLOGY | Age: 60
End: 2025-05-15

## 2025-05-15 DIAGNOSIS — Z96.21 COCHLEAR IMPLANT IN PLACE: ICD-10-CM

## 2025-05-15 DIAGNOSIS — H93.12 TINNITUS, LEFT EAR: ICD-10-CM

## 2025-05-15 DIAGNOSIS — H90.3 SENSORINEURAL HEARING LOSS (SNHL) OF BOTH EARS: Primary | ICD-10-CM

## 2025-05-15 NOTE — PROGRESS NOTES
Tori Bach  1965.59 y.o. female   [id number]      COCHLEAR IMPLANT (CI) MAPPING    Tori Bach was seen today, 5/15/2025 , for a Cochlear Implant maintenance appointment.      DEVICE INFORMATION:     LEFT:       Internal device & S/N:  SN: [id number] (E1 inactive due to abnormal perception)       External device & SN: Nucleus 8 SN: [id number]         Magnet size: 4       Date of surgery: 3/11/2024       Surgeon: Katarzyna Kay MD ([facility])       Initial Activation: 3/28/2024 ([facility], Martha Lew)    RIGHT:       Phonak Audeo L90-R    Equipment/Magnet Check:   Equipment check showed all equipment in good working order and magnet strength was appropriate.      INTERVAL HISTORY:     Patient reported her last CI appointment was 7/16/2024 at [facility] with Martha Lew.  She did note she attempted a Remote Check on 723/24 when the prompt was received, but she did not see any other details or feedback.  She received another prompt in April 2025, but was unable to complete this through the opal, which led her to scheduling today's appointment in person.  She regularly uses SCAN 2 FF setting.    She overall feels she is doing well with her sound processor and noted no concerns for sound quality.  She did note that the coil has fallen off of her processor at times; discussed putting request in her Cochlear portal, as this may be covered under warranty.  No pain, redness, or swelling at magnet site.  She has not done regular streaming to the device, but feels she is overall doing well.  One complaint for sound quality is her perception of male voices; some are clear while others, such as her , are difficult to understand.  She noted good sound quality through [employer] during pilates classes, and she really notices a difference if she does not have the accessory with her in that environment.    Hearing aid: has run into battery life issue with right Phonak hearing aid.

## 2025-07-10 ENCOUNTER — PROCEDURE VISIT (OUTPATIENT)
Dept: AUDIOLOGY | Age: 60
End: 2025-07-10

## 2025-07-10 DIAGNOSIS — H90.3 SENSORINEURAL HEARING LOSS (SNHL) OF BOTH EARS: Primary | ICD-10-CM

## 2025-07-23 ENCOUNTER — PROCEDURE VISIT (OUTPATIENT)
Dept: AUDIOLOGY | Age: 60
End: 2025-07-23

## 2025-07-23 DIAGNOSIS — H90.3 SENSORINEURAL HEARING LOSS (SNHL) OF BOTH EARS: Primary | ICD-10-CM

## 2025-07-28 NOTE — PROGRESS NOTES
Tori Bach   1965, 59 y.o. female   [id number]     EARMOLD FITTING      RIGHT EAR: /MODEL: Migue EPFoat Swim Plug  SN: [id number]    LEFT EAR: /MODEL: Migue EPFoat Swim Plug  SN: [id number]    EARMOLD FITTIN2025  WARRANTY: 10/16/2025      Tori Bach was seen today, 2025, to be fit with bilateral custom swim plugs. She demonstrated proper insertion and removal of the swim plugs while in the office.  Patient swims regularly and pursued swim plugs to prevent ear infections.      Device orientation was completed, including:   Earmold insertion/removal  Repair/remake period      PATIENT EDUCATION:     Tori Bach was provided with the Hearing Aid Fitting Checklist as a reference of items discussed at today's appointment.  Patient may find additional product information in the provided hearing aid  device manual.     RECOMMENDATIONS:      - Use swim plugs as needed for dry ear precautions.   - Contact Audiology if concerns arise.    Patient paid $250.00.  See associated charges below. Patient requested a claim be submitted to her insurance.  Description Code Amount   Earmold Impressions  $50.00 each   Earmold (Custom Swim Plug)  x2 $75.00 each          Martha Landrum  Audiologist

## 2025-08-20 ENCOUNTER — PROCEDURE VISIT (OUTPATIENT)
Dept: AUDIOLOGY | Age: 60
End: 2025-08-20

## 2025-08-20 DIAGNOSIS — H90.3 SENSORINEURAL HEARING LOSS (SNHL) OF BOTH EARS: Primary | ICD-10-CM

## 2025-09-05 ENCOUNTER — PROCEDURE VISIT (OUTPATIENT)
Dept: AUDIOLOGY | Age: 60
End: 2025-09-05

## 2025-09-05 DIAGNOSIS — H90.3 SENSORINEURAL HEARING LOSS (SNHL) OF BOTH EARS: Primary | ICD-10-CM
